# Patient Record
(demographics unavailable — no encounter records)

---

## 2025-01-30 NOTE — DISCUSSION/SUMMARY
[FreeTextEntry1] : Kisha is a 18yo F with hx ADHD, OCD, anxiety, ALEJANDRA, menorrhagia here for followup.   POCT UCG neg  Plan: - Declined STI screening - Quick start Tiffanie. Requested to contact provider with concerns before discontinuing OCP. Reviewed proper administration timing,  - FU in 1-3 months, call sooner with concerns

## 2025-01-30 NOTE — HISTORY OF PRESENT ILLNESS
[FreeTextEntry6] : Kisha is a 18yo F with hx ADHD, OCD, anxiety, ALEJANDRA, menorrhagia here for followup.   Since last visit, 7 months ago, took Montse for 1 pack but decreased libido therefore decided to discontinue. She restarted Prozac the same time and decided to not take a break from all medications.  She would like to try Tiffanie again because that was the only OCP that did not decrease her libido and thinks the pH changes was due to using flavored lubricant. She may be interested in Paragard in the future but afraid of the procedure  LMP: today day #2 Menses lasts 5-6 days, heavy days on 2-3 and changes super size tampon every 4 hrs, cramps 4/10, does not take OTC pain reliever. Condom consistently no reaction to condoms  Same partner, in monogamous relationship almost 2 year, no new partner. Working at [employer] while in college  ------------------------ History Reviewed:  Since last visit, she noticed decrease with her libido and only able to orgasm when she smokes marijuana or drink alcohol. Tiffany would like to increase her estrogen dose for Montse  She has tried Tiffanie but she had multiple vaginal yeast infections, norethindrone 0.35mg made her more anxious and had no libido  LMP: today day 3, menses onset on inactive pill 2, heavier than previous menses because Tiffany has extended her OCP and skipped the inactive pills in June. Previous LMP 5/2024 Same partner, in monogamous relationship almost 2 year, no new partner. Do not use condoms Side effects: decreased libido ACHES: neg Denies any painful urination, vaginal discharge/odor or lesions/mass  Stopped Adderall for ADHD due to side effects: sad and crying a lot and Zoloft for anxiety, OCD a year ago